# Patient Record
Sex: FEMALE | HISPANIC OR LATINO
[De-identification: names, ages, dates, MRNs, and addresses within clinical notes are randomized per-mention and may not be internally consistent; named-entity substitution may affect disease eponyms.]

---

## 2022-05-25 PROBLEM — Z00.00 ENCOUNTER FOR PREVENTIVE HEALTH EXAMINATION: Status: ACTIVE | Noted: 2022-05-25

## 2022-06-08 ENCOUNTER — APPOINTMENT (OUTPATIENT)
Dept: OBGYN | Facility: CLINIC | Age: 27
End: 2022-06-08
Payer: MEDICAID

## 2022-06-08 VITALS
BODY MASS INDEX: 23.39 KG/M2 | WEIGHT: 132 LBS | OXYGEN SATURATION: 98 % | HEART RATE: 94 BPM | SYSTOLIC BLOOD PRESSURE: 138 MMHG | HEIGHT: 63 IN | TEMPERATURE: 97.6 F | DIASTOLIC BLOOD PRESSURE: 93 MMHG

## 2022-06-08 DIAGNOSIS — R10.2 PELVIC AND PERINEAL PAIN: ICD-10-CM

## 2022-06-08 DIAGNOSIS — Z87.898 PERSONAL HISTORY OF OTHER SPECIFIED CONDITIONS: ICD-10-CM

## 2022-06-08 DIAGNOSIS — Z86.79 PERSONAL HISTORY OF OTHER DISEASES OF THE CIRCULATORY SYSTEM: ICD-10-CM

## 2022-06-08 DIAGNOSIS — N92.6 IRREGULAR MENSTRUATION, UNSPECIFIED: ICD-10-CM

## 2022-06-08 DIAGNOSIS — L29.2 PRURITUS VULVAE: ICD-10-CM

## 2022-06-08 PROCEDURE — 99204 OFFICE O/P NEW MOD 45 MIN: CPT

## 2022-06-08 RX ORDER — CLOTRIMAZOLE AND BETAMETHASONE DIPROPIONATE 10; .5 MG/G; MG/G
1-0.05 CREAM TOPICAL TWICE DAILY
Qty: 1 | Refills: 0 | Status: ACTIVE | COMMUNITY
Start: 2022-06-08 | End: 1900-01-01

## 2022-06-08 RX ORDER — FLUCONAZOLE 150 MG/1
150 TABLET ORAL
Qty: 2 | Refills: 1 | Status: ACTIVE | COMMUNITY
Start: 2022-06-08 | End: 1900-01-01

## 2022-06-08 NOTE — HISTORY OF PRESENT ILLNESS
[Normal Amount/Duration] :  normal amount and duration [Frequency: Q ___ days] : menstrual periods occur approximately every [unfilled] days [Menarche Age: ____] : age at menarche was [unfilled] [Menstrual Cramps] : menstrual cramps [[email or web address]] : 04/01/2022 [Currently Active] : currently active [Women] : women [Vaginal] : vaginal [No] : No

## 2022-06-08 NOTE — PHYSICAL EXAM
[Chaperone Present] : A chaperone was present in the examining room during all aspects of the physical examination [Appropriately responsive] : appropriately responsive [Alert] : alert [No Acute Distress] : no acute distress [No Lymphadenopathy] : no lymphadenopathy [Regular Rate Rhythm] : regular rate rhythm [No Murmurs] : no murmurs [Clear to Auscultation B/L] : clear to auscultation bilaterally [Soft] : soft [Non-tender] : non-tender [Non-distended] : non-distended [No HSM] : No HSM [No Lesions] : no lesions [No Mass] : no mass [Oriented x3] : oriented x3 [Examination Of The Breasts] : a normal appearance [No Masses] : no breast masses were palpable [Labia Majora] : normal [Labia Minora] : normal [Discharge] : discharge [Scant] : scant [Foul Smelling] : not foul smelling [Clear] : clear [Thin] : thin [Normal] : normal [Normal Position] : in a normal position [Uterine Adnexae] : normal

## 2022-06-09 LAB
APPEARANCE: ABNORMAL
BACTERIA: NEGATIVE
BILIRUBIN URINE: NEGATIVE
BLOOD URINE: NEGATIVE
COLOR: YELLOW
GLUCOSE QUALITATIVE U: NEGATIVE
HYALINE CASTS: 0 /LPF
KETONES URINE: NEGATIVE
LEUKOCYTE ESTERASE URINE: NEGATIVE
MICROSCOPIC-UA: NORMAL
NITRITE URINE: NEGATIVE
PH URINE: 6
PROTEIN URINE: NORMAL
RED BLOOD CELLS URINE: 5 /HPF
SPECIFIC GRAVITY URINE: >=1.03
SQUAMOUS EPITHELIAL CELLS: 2 /HPF
URINE COMMENTS: NORMAL
UROBILINOGEN URINE: NORMAL
WHITE BLOOD CELLS URINE: 2 /HPF

## 2022-06-10 LAB
BACTERIA UR CULT: NORMAL
CANDIDA VAG CYTO: DETECTED
G VAGINALIS+PREV SP MTYP VAG QL MICRO: DETECTED
T VAGINALIS VAG QL WET PREP: NOT DETECTED

## 2022-06-10 RX ORDER — METRONIDAZOLE 500 MG/1
500 TABLET ORAL TWICE DAILY
Qty: 14 | Refills: 0 | Status: ACTIVE | COMMUNITY
Start: 2022-06-10 | End: 1900-01-01

## 2022-06-13 LAB — CYTOLOGY CVX/VAG DOC THIN PREP: ABNORMAL
